# Patient Record
Sex: FEMALE | URBAN - METROPOLITAN AREA
[De-identification: names, ages, dates, MRNs, and addresses within clinical notes are randomized per-mention and may not be internally consistent; named-entity substitution may affect disease eponyms.]

---

## 2022-01-01 ENCOUNTER — APPOINTMENT (RX ONLY)
Dept: URBAN - METROPOLITAN AREA CLINIC 26 | Facility: CLINIC | Age: 67
Setting detail: DERMATOLOGY
End: 2022-01-01

## 2022-01-01 PROCEDURE — ? POST-OP WOUND CHECK

## 2022-01-01 PROCEDURE — 99024 POSTOP FOLLOW-UP VISIT: CPT

## 2022-11-29 ENCOUNTER — APPOINTMENT (RX ONLY)
Dept: URBAN - METROPOLITAN AREA CLINIC 26 | Facility: CLINIC | Age: 67
Setting detail: DERMATOLOGY
End: 2022-11-29

## 2022-11-29 PROBLEM — D03.39 MELANOMA IN SITU OF OTHER PARTS OF FACE: Status: ACTIVE | Noted: 2022-11-29

## 2022-11-29 PROCEDURE — ? ADDITIONAL NOTES

## 2022-11-29 PROCEDURE — 88342 IMHCHEM/IMCYTCHM 1ST ANTB: CPT | Mod: 59

## 2022-11-29 PROCEDURE — 14301 TIS TRNFR ANY 30.1-60 SQ CM: CPT

## 2022-11-29 PROCEDURE — 17311 MOHS 1 STAGE H/N/HF/G: CPT

## 2022-11-29 PROCEDURE — ? MOHS SURGERY WITH DEBULK SPECIMEN

## 2022-11-29 NOTE — PROCEDURE: MIPS QUALITY
Detail Level: Detailed
Quality 130: Documentation Of Current Medications In The Medical Record: Current Medications Documented
Quality 111:Pneumonia Vaccination Status For Older Adults: Pneumococcal vaccine (PPSV23) administered on or after patient’s 60th birthday and before the end of the measurement period
Quality 137: Melanoma: Continuity Of Care - Recall System: Documentation of system reason(s) for not entering patient's information into a recall system (eg, melanoma being monitored by another physician provider)
Quality 431: Preventive Care And Screening: Unhealthy Alcohol Use - Screening: Patient not identified as an unhealthy alcohol user when screened for unhealthy alcohol use using a systematic screening method
Quality 138: Melanoma: Coordination Of Care: A treatment plan was communicated to the physicians providing continuing care within one month of diagnosis outlining: diagnosis, tumor thickness and a plan for surgery or alternate care.
Quality 110: Preventive Care And Screening: Influenza Immunization: Influenza Immunization Administered during Influenza season
Quality 47: Advance Care Plan: Advance Care Planning discussed and documented; advance care plan or surrogate decision maker documented in the medical record.
Quality 226: Preventive Care And Screening: Tobacco Use: Screening And Cessation Intervention: Patient screened for tobacco use and is an ex/non-smoker

## 2022-11-29 NOTE — PROCEDURE: MOHS SURGERY WITH DEBULK SPECIMEN
Mart-1 - Positive Histology Text: MART-1 staining demonstrates areas of higher density and clustering of melanocytes with Pagetoid spread upwards within the epidermis.

## 2022-11-29 NOTE — PROCEDURE: MOHS SURGERY WITH DEBULK SPECIMEN
independent Transposition Flap Text: The defect edges were debeveled with a #15 scalpel blade.  Given the location of the defect and the proximity to free margins a transposition flap was deemed most appropriate.  Using a sterile surgical marker, an appropriate transposition flap was drawn incorporating the defect.    The area thus outlined was incised deep to adipose tissue with a #15 scalpel blade.  The skin margins were undermined to an appropriate distance in all directions utilizing iris scissors.

## 2022-11-29 NOTE — PROCEDURE: MOHS SURGERY WITH DEBULK SPECIMEN
Showering allowed/Stairs allowed/Do not make important decisions/No heavy lifting/straining/Walking - Indoors allowed Advancement Flap (Single) Text: The defect edges were debeveled with a #15 scalpel blade.  Given the location of the defect and the proximity to free margins a single advancement flap was deemed most appropriate.  Using a sterile surgical marker, an appropriate advancement flap was drawn incorporating the defect and placing the expected incisions within the relaxed skin tension lines where possible.    The area thus outlined was incised deep to adipose tissue with a #15 scalpel blade.  The skin margins were undermined to an appropriate distance in all directions utilizing iris scissors.

## 2022-11-29 NOTE — PROCEDURE: MOHS SURGERY WITH DEBULK SPECIMEN
Stage 1 Add-On Histology Text: Mart 1 controls appropriate.  On debulking specimen, there are several nests of melanocytes staining positive for MART-1 in the superficial dermis.  These may represent foci of superficial invasive, but their proximity to a hair follicle suggests they may represent tangential sectioning of melanocytes extending down a follicular unit.

## 2022-12-05 ENCOUNTER — APPOINTMENT (RX ONLY)
Dept: URBAN - METROPOLITAN AREA CLINIC 26 | Facility: CLINIC | Age: 67
Setting detail: DERMATOLOGY
End: 2022-12-05

## 2022-12-05 ENCOUNTER — OFFICE VISIT (OUTPATIENT)
Dept: WOUND CARE | Facility: HOSPITAL | Age: 67
End: 2022-12-05

## 2022-12-05 VITALS
TEMPERATURE: 97.1 F | HEART RATE: 68 BPM | WEIGHT: 248 LBS | HEIGHT: 67 IN | RESPIRATION RATE: 18 BRPM | DIASTOLIC BLOOD PRESSURE: 80 MMHG | SYSTOLIC BLOOD PRESSURE: 120 MMHG | BODY MASS INDEX: 38.92 KG/M2

## 2022-12-05 DIAGNOSIS — L89.213 PRESSURE INJURY OF RIGHT HIP, STAGE 3 (HCC): Primary | ICD-10-CM

## 2022-12-05 PROBLEM — D03.39 MELANOMA IN SITU OF OTHER PARTS OF FACE: Status: ACTIVE | Noted: 2022-12-05

## 2022-12-05 PROCEDURE — 99024 POSTOP FOLLOW-UP VISIT: CPT

## 2022-12-05 PROCEDURE — ? SUTURE REMOVAL (GLOBAL PERIOD)

## 2022-12-05 PROCEDURE — ? ADDITIONAL NOTES

## 2022-12-05 RX ORDER — LIDOCAINE 40 MG/G
CREAM TOPICAL ONCE
Status: COMPLETED | OUTPATIENT
Start: 2022-12-05 | End: 2022-12-05

## 2022-12-05 RX ADMIN — LIDOCAINE: 40 CREAM TOPICAL at 11:16

## 2022-12-05 NOTE — PATIENT INSTRUCTIONS
Orders Placed This Encounter   Procedures    Wound cleansing and dressings     RIGHT HIP  Wash your hands with soap and water  Remove old dressing, discard into plastic bag and place in trash  Cleanse the wound with MILD SOAP AND WATER OR NORMAL SALINE prior to applying a clean dressing  Do not use tissue or cotton balls  Do not scrub the wound  Pat dry using gauze  Shower YES  Apply PURACOL (COLLAGEN) to the RIGHT HIP wound  Secure with ALLEVYN BORDER (SILICON DRESSING)  Change dressing MWF  Dressing above applied today at [facility]  Next appt  in 1 week  Offload pressure as much as possible  Continue with protein smoothies, and protein into diet       Standing Status:   Future     Standing Expiration Date:   12/5/2023

## 2022-12-05 NOTE — PROCEDURE: SUTURE REMOVAL (GLOBAL PERIOD)
Detail Level: Detailed
Add 15755 Cpt? (Important Note: In 2017 The Use Of 62381 Is Being Tracked By Cms To Determine Future Global Period Reimbursement For Global Periods): yes

## 2022-12-08 NOTE — PROGRESS NOTES
Patient ID: Haleigh Cartwright is a 79 y o  female Date of Birth 1955     Chief Complaint  Chief Complaint   Patient presents with   • New Patient Visit       Allergies  Patient has no allergy information on record  Assessment:    No problem-specific Assessment & Plan notes found for this encounter  Diagnoses and all orders for this visit:    Pressure injury of right hip, stage 3 (HCC)  -     lidocaine (LMX) 4 % cream  -     Wound cleansing and dressings; Future  -     Debridement              Debridement   Wound 12/05/22 Pressure Injury Hip Right    Universal Protocol:  Consent: Verbal consent obtained  Consent given by: patient  Time out: Immediately prior to procedure a "time out" was called to verify the correct patient, procedure, equipment, support staff and site/side marked as required    Timeout called at: 12/5/2022 10:45 AM   Patient understanding: patient states understanding of the procedure being performed  Patient identity confirmed: verbally with patient      Performed by: physician  Debridement type: surgical  Level of debridement: subcutaneous tissue  Pain control: lidocaine 4%  Post-debridement measurements  Length (cm): 1  Width (cm): 1 1  Depth (cm): 0 3  Percent debrided: 100%  Surface Area (cm^2): 1 1  Area debrided (cm^2): 1 1  Volume (cm^3): 0 33  Tissue and other material debrided: subcutaneous tissue  Devitalized tissue debrided: exudate and slough  Instrument(s) utilized: curette  Bleeding: small  Hemostasis obtained with: pressure  Response to treatment: procedure was tolerated well          Plan:  Initial evaluation  Debrided as above  Changed wound management to Puracol and foam, see wound orders below  Pressure-relief  Adequate protein intake  Follow-up in 1 week or call sooner with questions or concerns    Wound 12/05/22 Pressure Injury Hip Right (Active)   Wound Image Images linked 12/05/22 1120   Wound Description Pink;Yellow;Slough 12/05/22 1104   Genna-wound Assessment Intact 12/05/22 1104   Wound Length (cm) 1 cm 12/05/22 1104   Wound Width (cm) 1 1 cm 12/05/22 1104   Wound Depth (cm) 0 2 cm 12/05/22 1104   Wound Surface Area (cm^2) 1 1 cm^2 12/05/22 1104   Wound Volume (cm^3) 0 22 cm^3 12/05/22 1104   Calculated Wound Volume (cm^3) 0 22 cm^3 12/05/22 1104   Drainage Amount Moderate 12/05/22 1104   Drainage Description Serosanguineous 12/05/22 1104   Non-staged Wound Description Full thickness 12/05/22 1104   Dressing Status Intact 12/05/22 1104       Wound 12/05/22 Pressure Injury Hip Right (Active)   Date First Assessed/Time First Assessed: 12/05/22 1104   Primary Wound Type: Pressure Injury  Location: Hip  Wound Location Orientation: Right       Subjective:        Patient presents for initial evaluation of a stage III pressure ulcer of the right hip unclear exactly how it started but it may have been initially a burn has been present for the past few months  Patient has been following at another wound center in [place]  They have been using Hydrofera Blue on the wound for the past few months and noted some slow improvement  Patient is currently undergoing treatment for glioblastoma  The following portions of the patient's history were reviewed and updated as appropriate:   She  has no past medical history on file  She There are no problems to display for this patient  She  has no past surgical history on file  She  reports that she has never smoked  She has never used smokeless tobacco  No history on file for alcohol use and drug use  No current outpatient medications on file  No current facility-administered medications for this visit  She has no allergies on file       Review of Systems   Constitutional: Negative for chills and fever  HENT: Negative for congestion and sneezing  Respiratory: Negative for cough  Skin: Positive for wound  Psychiatric/Behavioral: Negative for agitation           Objective:       Wound 12/05/22 Pressure Injury Hip Right (Active)   Wound Image Images linked 12/05/22 1120   Wound Description Pink;Yellow;Slough 12/05/22 1104   Genna-wound Assessment Intact 12/05/22 1104   Wound Length (cm) 1 cm 12/05/22 1104   Wound Width (cm) 1 1 cm 12/05/22 1104   Wound Depth (cm) 0 2 cm 12/05/22 1104   Wound Surface Area (cm^2) 1 1 cm^2 12/05/22 1104   Wound Volume (cm^3) 0 22 cm^3 12/05/22 1104   Calculated Wound Volume (cm^3) 0 22 cm^3 12/05/22 1104   Drainage Amount Moderate 12/05/22 1104   Drainage Description Serosanguineous 12/05/22 1104   Non-staged Wound Description Full thickness 12/05/22 1104   Dressing Status Intact 12/05/22 1104       /80   Pulse 68   Temp (!) 97 1 °F (36 2 °C)   Resp 18   Ht 5' 6 5" (1 689 m)   Wt 112 kg (248 lb)   BMI 39 43 kg/m²     Physical Exam  Vitals reviewed  Constitutional:       General: She is not in acute distress  Appearance: Normal appearance  She is obese  She is not ill-appearing, toxic-appearing or diaphoretic  HENT:      Head: Normocephalic and atraumatic  Right Ear: External ear normal       Left Ear: External ear normal    Eyes:      Conjunctiva/sclera: Conjunctivae normal    Pulmonary:      Effort: Pulmonary effort is normal  No respiratory distress  Musculoskeletal:      Cervical back: Neck supple  Skin:     Comments: See wound assessment   Neurological:      Mental Status: She is alert     Psychiatric:         Mood and Affect: Mood normal          Behavior: Behavior normal            Wound 12/05/22 Pressure Injury Hip Right (Active)   Wound Image   12/05/22 1120   Wound Description Pink;Yellow;Slough 12/05/22 1104   Genna-wound Assessment Intact 12/05/22 1104   Wound Length (cm) 1 cm 12/05/22 1104   Wound Width (cm) 1 1 cm 12/05/22 1104   Wound Depth (cm) 0 2 cm 12/05/22 1104   Wound Surface Area (cm^2) 1 1 cm^2 12/05/22 1104   Wound Volume (cm^3) 0 22 cm^3 12/05/22 1104   Calculated Wound Volume (cm^3) 0 22 cm^3 12/05/22 1104   Drainage Amount Moderate 12/05/22 1104   Drainage Description Serosanguineous 12/05/22 1104   Non-staged Wound Description Full thickness 12/05/22 1104   Dressing Status Intact 12/05/22 1104                         Wound Instructions:  Orders Placed This Encounter   Procedures   • Wound cleansing and dressings     RIGHT HIP  Wash your hands with soap and water  Remove old dressing, discard into plastic bag and place in trash  Cleanse the wound with MILD SOAP AND WATER OR NORMAL SALINE prior to applying a clean dressing  Do not use tissue or cotton balls  Do not scrub the wound  Pat dry using gauze  Shower YES  Apply PURACOL (COLLAGEN) to the RIGHT HIP wound  Secure with ALLEVYN BORDER (SILICON DRESSING)  Change dressing MWF  Dressing above applied today at [facility]  Next appt  in 1 week  Offload pressure as much as possible  Continue with protein smoothies, and protein into diet  Standing Status:   Future     Standing Expiration Date:   12/5/2023   • Debridement     This order was created via procedure documentation        Diagnosis ICD-10-CM Associated Orders   1   Pressure injury of right hip, stage 3 (HCC)  L89 213 lidocaine (LMX) 4 % cream     Wound cleansing and dressings     Debridement

## 2022-12-12 ENCOUNTER — OFFICE VISIT (OUTPATIENT)
Dept: WOUND CARE | Facility: HOSPITAL | Age: 67
End: 2022-12-12

## 2022-12-12 VITALS — SYSTOLIC BLOOD PRESSURE: 114 MMHG | DIASTOLIC BLOOD PRESSURE: 60 MMHG | RESPIRATION RATE: 18 BRPM | HEART RATE: 72 BPM

## 2022-12-12 DIAGNOSIS — L89.213 PRESSURE INJURY OF RIGHT HIP, STAGE 3 (HCC): Primary | ICD-10-CM

## 2022-12-12 RX ORDER — SIMVASTATIN 40 MG
40 TABLET ORAL EVERY EVENING
COMMUNITY
Start: 2022-11-16

## 2022-12-12 RX ORDER — METHYLPHENIDATE HYDROCHLORIDE 5 MG/1
5 TABLET ORAL 2 TIMES DAILY PRN
COMMUNITY
Start: 2022-10-24 | End: 2023-01-22

## 2022-12-12 RX ORDER — SENNA PLUS 8.6 MG/1
2 TABLET ORAL DAILY PRN
COMMUNITY
Start: 2022-06-28

## 2022-12-12 RX ORDER — LOSARTAN POTASSIUM 50 MG/1
50 TABLET ORAL DAILY
COMMUNITY
Start: 2022-06-30

## 2022-12-12 RX ORDER — HYDROCHLOROTHIAZIDE 25 MG/1
25 TABLET ORAL EVERY MORNING
COMMUNITY
Start: 2022-12-04

## 2022-12-12 RX ORDER — DEXAMETHASONE 2 MG/1
1 TABLET ORAL DAILY
COMMUNITY
Start: 2022-11-10

## 2022-12-12 RX ORDER — LIDOCAINE 40 MG/G
CREAM TOPICAL ONCE
Status: COMPLETED | OUTPATIENT
Start: 2022-12-12 | End: 2022-12-12

## 2022-12-12 RX ORDER — PSEUDOEPHEDRINE HCL 30 MG
100 TABLET ORAL 2 TIMES DAILY
COMMUNITY
Start: 2022-06-27

## 2022-12-12 RX ORDER — FAMOTIDINE 10 MG
10 TABLET ORAL DAILY
COMMUNITY
Start: 2022-06-27

## 2022-12-12 RX ORDER — ACETAMINOPHEN 325 MG/1
650 TABLET ORAL
COMMUNITY

## 2022-12-12 RX ORDER — PROPRANOLOL HYDROCHLORIDE 20 MG/1
20 TABLET ORAL
COMMUNITY
Start: 2022-09-06

## 2022-12-12 RX ORDER — ONDANSETRON HYDROCHLORIDE 8 MG/1
TABLET, FILM COATED ORAL
COMMUNITY
Start: 2022-11-26

## 2022-12-12 RX ORDER — POLYETHYLENE GLYCOL 3350 17 G/17G
17 POWDER, FOR SOLUTION ORAL DAILY PRN
COMMUNITY
Start: 2022-06-27

## 2022-12-12 RX ORDER — TEMOZOLOMIDE 100 MG/1
CAPSULE ORAL
COMMUNITY
Start: 2022-11-10

## 2022-12-12 RX ADMIN — LIDOCAINE 1 APPLICATION: 40 CREAM TOPICAL at 11:06

## 2022-12-12 NOTE — PROGRESS NOTES
Patient ID: Ravin Winston is a 79 y o  female Date of Birth 1955     Chief Complaint  Chief Complaint   Patient presents with   • Follow Up Wound Care Visit     Right hip wound       Allergies  Codeine, Oxycodone, Oxycodone-acetaminophen, and Tramadol    Assessment:    No problem-specific Assessment & Plan notes found for this encounter  Diagnoses and all orders for this visit:    Pressure injury of right hip, stage 3 (HCC)  -     Wound cleansing and dressings; Future  -     lidocaine (LMX) 4 % cream  -     Debridement              Debridement   Wound 12/05/22 Pressure Injury Hip Right    Universal Protocol:  Consent: Verbal consent obtained  Consent given by: patient  Time out: Immediately prior to procedure a "time out" was called to verify the correct patient, procedure, equipment, support staff and site/side marked as required    Timeout called at: 12/12/2022 10:45 AM   Patient understanding: patient states understanding of the procedure being performed  Patient identity confirmed: verbally with patient      Performed by: physician  Debridement type: selective  Pain control: lidocaine 4%  Post-debridement measurements  Length (cm): 0 9  Width (cm): 0 8  Depth (cm): 0 1  Percent debrided: 100%  Surface Area (cm^2): 0 72  Area debrided (cm^2): 0 72  Volume (cm^3): 0 07  Devitalized tissue debrided: biofilm  Instrument(s) utilized: curette  Bleeding: small  Hemostasis obtained with: pressure  Procedural pain (0-10): 0  Post-procedural pain: 0   Response to treatment: procedure was tolerated well          Plan:  Wound is improved  Debrided as above  Continue wound management Puracol Ag and foam, see wound orders below  Pressure-relief  Follow-up in 3 weeks or call sooner with questions or concerns    Wound 12/05/22 Pressure Injury Hip Right (Active)   Wound Image Images linked 12/12/22 1034   Wound Description Pink;Yellow;Slough 12/12/22 1034   Pressure Injury Stage 2 12/12/22 1034   Genna-wound Assessment Intact 12/12/22 1034   Wound Length (cm) 0 9 cm 12/12/22 1034   Wound Width (cm) 0 8 cm 12/12/22 1034   Wound Depth (cm) 0 1 cm 12/12/22 1034   Wound Surface Area (cm^2) 0 72 cm^2 12/12/22 1034   Wound Volume (cm^3) 0 072 cm^3 12/12/22 1034   Calculated Wound Volume (cm^3) 0 07 cm^3 12/12/22 1034   Change in Wound Size % 68 18 12/12/22 1034   Drainage Amount Moderate 12/12/22 1034   Drainage Description Serosanguineous 12/12/22 1034   Non-staged Wound Description Full thickness 12/12/22 1034       Wound 12/05/22 Pressure Injury Hip Right (Active)   Date First Assessed/Time First Assessed: 12/05/22 1104   Primary Wound Type: Pressure Injury  Location: Hip  Wound Location Orientation: Right       Subjective:        Patient presents for follow-up of a stage III pressure ulcer of the right hip  No increased pain or drainage  Has been using Puracol and foam on the wound  The following portions of the patient's history were reviewed and updated as appropriate: She  has no past medical history on file  She There are no problems to display for this patient  She  reports that she has never smoked  She has never used smokeless tobacco  No history on file for alcohol use and drug use  She is allergic to codeine, oxycodone, oxycodone-acetaminophen, and tramadol       Review of Systems   Constitutional: Negative for chills and fever  HENT: Negative for congestion and sneezing  Respiratory: Negative for cough  Skin: Positive for wound  Psychiatric/Behavioral: Negative for agitation           Objective:       Wound 12/05/22 Pressure Injury Hip Right (Active)   Wound Image Images linked 12/12/22 1034   Wound Description Pink;Yellow;Slough 12/12/22 1034   Pressure Injury Stage 2 12/12/22 1034   Genna-wound Assessment Intact 12/12/22 1034   Wound Length (cm) 0 9 cm 12/12/22 1034   Wound Width (cm) 0 8 cm 12/12/22 1034   Wound Depth (cm) 0 1 cm 12/12/22 1034   Wound Surface Area (cm^2) 0 72 cm^2 12/12/22 1034   Wound Volume (cm^3) 0 072 cm^3 12/12/22 1034   Calculated Wound Volume (cm^3) 0 07 cm^3 12/12/22 1034   Change in Wound Size % 68 18 12/12/22 1034   Drainage Amount Moderate 12/12/22 1034   Drainage Description Serosanguineous 12/12/22 1034   Non-staged Wound Description Full thickness 12/12/22 1034       /60   Pulse 72   Resp 18     Physical Exam        Wound 12/05/22 Pressure Injury Hip Right (Active)   Wound Image   12/12/22 1034   Wound Description Pink;Yellow;Slough 12/12/22 1034   Pressure Injury Stage 2 12/12/22 1034   Genna-wound Assessment Intact 12/12/22 1034   Wound Length (cm) 0 9 cm 12/12/22 1034   Wound Width (cm) 0 8 cm 12/12/22 1034   Wound Depth (cm) 0 1 cm 12/12/22 1034   Wound Surface Area (cm^2) 0 72 cm^2 12/12/22 1034   Wound Volume (cm^3) 0 072 cm^3 12/12/22 1034   Calculated Wound Volume (cm^3) 0 07 cm^3 12/12/22 1034   Change in Wound Size % 68 18 12/12/22 1034   Drainage Amount Moderate 12/12/22 1034   Drainage Description Serosanguineous 12/12/22 1034   Non-staged Wound Description Full thickness 12/12/22 1034   Dressing Status Intact 12/05/22 1104                         Wound Instructions:  Orders Placed This Encounter   Procedures   • Wound cleansing and dressings     Wound cleansing and dressings      RIGHT HIP  Wash your hands with soap and water  Remove old dressing, discard into plastic bag and place in trash  Cleanse the wound with MILD SOAP AND WATER OR NORMAL SALINE prior to applying a clean dressing  Do not use tissue or cotton balls  Do not scrub the wound  Pat dry using gauze      Shower YES  Apply PURACOL (COLLAGEN) to the RIGHT HIP wound  Secure with ALLEVYN BORDER (SILICON DRESSING)  Change dressing MWF  Dressing above applied today at [facility]  Next appt in 3 weeks on Thursday 01/05/2022     Offload pressure as much as possible  Continue with protein smoothies, and protein into diet       Standing Status:   Future     Standing Expiration Date: 12/12/2023   • Debridement     This order was created via procedure documentation        Diagnosis ICD-10-CM Associated Orders   1   Pressure injury of right hip, stage 3 (HCC)  L89 213 Wound cleansing and dressings     lidocaine (LMX) 4 % cream     Debridement

## 2022-12-19 ENCOUNTER — APPOINTMENT (RX ONLY)
Dept: URBAN - METROPOLITAN AREA CLINIC 26 | Facility: CLINIC | Age: 67
Setting detail: DERMATOLOGY
End: 2022-12-19

## 2022-12-19 PROBLEM — D03.39 MELANOMA IN SITU OF OTHER PARTS OF FACE: Status: ACTIVE | Noted: 2022-12-19

## 2022-12-19 PROCEDURE — ? ADDITIONAL NOTES

## 2022-12-19 PROCEDURE — ? ORDER TESTS

## 2022-12-19 PROCEDURE — ? PRESCRIPTION MEDICATION MANAGEMENT

## 2022-12-19 PROCEDURE — ? POST-OP WOUND CHECK

## 2022-12-19 PROCEDURE — 99024 POSTOP FOLLOW-UP VISIT: CPT

## 2022-12-19 NOTE — PROCEDURE: POST-OP WOUND CHECK
Detail Level: Detailed
Add 83090 Cpt? (Important Note: In 2017 The Use Of 87260 Is Being Tracked By Cms To Determine Future Global Period Reimbursement For Global Periods): yes
Wound Assessment Override (Optional): The wound is infected and draining purulent fluid. It was dehisced minimally.
Wound Evaluated By: Sylvester Richardson M.D.
Additional Comments: Dr. Richardson drained the purulent matter from the surgical site and sent a sample for culture.  A solitary prolene fragment was removed.  Patient is on Bactrim M/W/F.  Advised patient to increase this to QD for 7 days then may resume M/W/F regimen.  Patient has follow up scheduled on January 9, but was advised to contact our office for an earlier appointment if there is any worsening in her condition or if she has any other conerns.

## 2022-12-19 NOTE — PROCEDURE: PRESCRIPTION MEDICATION MANAGEMENT
Detail Level: Zone
Initiate Treatment: Trimethoprim Sulfamethoxazole
Plan: Take 1 tablet once daily for 7 days
Render In Strict Bullet Format?: No

## 2022-12-19 NOTE — PROCEDURE: ORDER TESTS
Bill For Surgical Tray: no
Expected Date Of Service: 12/19/2022
Billing Type: Third-Party Bill
Lab Facility: 0
Performing Laboratory: -[id number]

## 2022-12-19 NOTE — PROCEDURE: MIPS QUALITY
Quality 110: Preventive Care And Screening: Influenza Immunization: Influenza Immunization Administered during Influenza season
Quality 47: Advance Care Plan: Advance Care Planning discussed and documented; advance care plan or surrogate decision maker documented in the medical record.
Quality 431: Preventive Care And Screening: Unhealthy Alcohol Use - Screening: Patient not identified as an unhealthy alcohol user when screened for unhealthy alcohol use using a systematic screening method
Quality 138: Melanoma: Coordination Of Care: A treatment plan was communicated to the physicians providing continuing care within one month of diagnosis outlining: diagnosis, tumor thickness and a plan for surgery or alternate care.
Quality 226: Preventive Care And Screening: Tobacco Use: Screening And Cessation Intervention: Patient screened for tobacco use and is an ex/non-smoker
Quality 111:Pneumonia Vaccination Status For Older Adults: Pneumococcal vaccine (PPSV23) administered on or after patient’s 60th birthday and before the end of the measurement period
Quality 137: Melanoma: Continuity Of Care - Recall System: Documentation of system reason(s) for not entering patient's information into a recall system (eg, melanoma being monitored by another physician provider)
Detail Level: Detailed
Quality 130: Documentation Of Current Medications In The Medical Record: Current Medications Documented

## 2022-12-27 PROBLEM — D03.39 MELANOMA IN SITU OF OTHER PARTS OF FACE: Status: ACTIVE | Noted: 2022-01-01

## 2022-12-27 NOTE — PROCEDURE: POST-OP WOUND CHECK
Detail Level: Detailed
Add 32683 Cpt? (Important Note: In 2017 The Use Of 83702 Is Being Tracked By Cms To Determine Future Global Period Reimbursement For Global Periods): yes
Wound Assessment Override (Optional): fluctuant nodule lateral to midportion of flap
Wound Evaluated By: Sylvester Richardson M.D.
Additional Comments: Fluctuant nodule was incised and drained of all purulence.  Previously drained nodules have not recurred and the remainder of the flap is healing accordingly.  The patient has completed a course of TMP/SMX.  She was asked to contact us if any new nodules develop that might require drainage.  Otherwise she may follow up as scheduled.

## 2023-01-01 ENCOUNTER — RX ONLY (OUTPATIENT)
Age: 68
Setting detail: RX ONLY
End: 2023-01-01

## 2023-01-01 ENCOUNTER — APPOINTMENT (RX ONLY)
Dept: URBAN - METROPOLITAN AREA CLINIC 26 | Facility: CLINIC | Age: 68
Setting detail: DERMATOLOGY
End: 2023-01-01

## 2023-01-01 PROCEDURE — ? ORDER TESTS

## 2023-01-01 PROCEDURE — ? POST-OP WOUND CHECK

## 2023-01-01 PROCEDURE — 99024 POSTOP FOLLOW-UP VISIT: CPT

## 2023-01-01 PROCEDURE — ? ADDITIONAL NOTES

## 2023-01-01 RX ORDER — SULFAMETHOXAZOLE AND TRIMETHOPRIM 800; 160 MG/1; MG/1
800-160 TABLET ORAL BID
Qty: 14 | Refills: 0 | Status: ERX | COMMUNITY
Start: 2023-01-01

## 2023-01-04 PROBLEM — D03.39 MELANOMA IN SITU OF OTHER PARTS OF FACE: Status: ACTIVE | Noted: 2023-01-01

## 2023-01-04 NOTE — PROCEDURE: ORDER TESTS
Billing Type: Third-Party Bill
Bill For Surgical Tray: no
Expected Date Of Service: 01/04/2023
Lab Facility: 0
Performing Laboratory: -[id number]

## 2023-01-04 NOTE — PROCEDURE: POST-OP WOUND CHECK
Additional Comments: Wound was drained by Dr. Richardson, sample obtained for culture. She is scheduled to see her oncologist and may resume antiobiotic regimen if he prefers to continue. Additional Comments: Wound was drained by Dr. Richardosn, sample obtained for culture. She is scheduled to see her oncologist and may resume antiobiotic regimen if he prefers to continue.

## 2023-01-04 NOTE — PROCEDURE: POST-OP WOUND CHECK
Add 93104 Cpt? (Important Note: In 2017 The Use Of 09816 Is Being Tracked By Cms To Determine Future Global Period Reimbursement For Global Periods): yes

## 2023-01-09 PROBLEM — D03.39 MELANOMA IN SITU OF OTHER PARTS OF FACE: Status: ACTIVE | Noted: 2023-01-01

## 2023-01-09 NOTE — PROCEDURE: POST-OP WOUND CHECK
Detail Level: Detailed
Add 94558 Cpt? (Important Note: In 2017 The Use Of 43627 Is Being Tracked By Cms To Determine Future Global Period Reimbursement For Global Periods): yes
Wound Assessment Override (Optional): clean, no purulence, infection resolving
Wound Dressing Override (Optional): band aid
Wound Evaluated By: Sylvester Richardson M.D.
Additional Comments: Blood was noted from the lateral edge of the wound when Dr. Richardson drained the wound. He did not recommend starting patient on antibiotic since patient has improved clinically.  Culture results are pending.

## 2023-01-12 ENCOUNTER — OFFICE VISIT (OUTPATIENT)
Dept: WOUND CARE | Facility: HOSPITAL | Age: 68
End: 2023-01-12

## 2023-01-12 VITALS
HEART RATE: 84 BPM | RESPIRATION RATE: 18 BRPM | SYSTOLIC BLOOD PRESSURE: 122 MMHG | TEMPERATURE: 98.8 F | DIASTOLIC BLOOD PRESSURE: 80 MMHG

## 2023-01-12 DIAGNOSIS — L89.213 PRESSURE INJURY OF RIGHT HIP, STAGE 3 (HCC): Primary | ICD-10-CM

## 2023-01-12 RX ORDER — TRAMADOL HYDROCHLORIDE 50 MG/1
50 TABLET ORAL EVERY 6 HOURS PRN
COMMUNITY

## 2023-01-12 RX ORDER — GABAPENTIN 100 MG/1
100 CAPSULE ORAL 3 TIMES DAILY
COMMUNITY

## 2023-01-12 NOTE — PATIENT INSTRUCTIONS
Orders Placed This Encounter   Procedures    Wound cleansing and dressings     Your wound is healed  You are discharged from wound care  Cover the hip area with a foam silicone bordered gauze for about a week to protect the new fragile skin  Please call wound care if you should develop any new wounds       Standing Status:   Future     Standing Expiration Date:   1/12/2024

## 2023-01-17 NOTE — PROGRESS NOTES
Patient ID: Sylvie Uribe is a 79 y o  female Date of Birth 1955     Chief Complaint  Chief Complaint   Patient presents with   • Follow Up Wound Care Visit     Hip wound       Allergies  Codeine, Oxycodone, Oxycodone-acetaminophen, and Tramadol    Assessment:    No problem-specific Assessment & Plan notes found for this encounter  Diagnoses and all orders for this visit:    Pressure injury of right hip, stage 3 (HCC)  -     Wound cleansing and dressings; Future    Other orders  -     gabapentin (NEURONTIN) 100 mg capsule; Take 100 mg by mouth 3 (three) times a day  -     traMADol (ULTRAM) 50 mg tablet; Take 50 mg by mouth every 6 (six) hours as needed for moderate pain              Procedures    Plan:  Wound is closed  Keep the area covered and protected for about another week  Follow-up here in wound management center as needed or call with questions or concerns    Wound 12/05/22 Pressure Injury Hip Right (Active)   Wound Image Images linked 01/12/23 1024   Wound Description Epithelialization;Pink 01/12/23 1024   Genna-wound Assessment Intact 01/12/23 1024   Wound Length (cm) 0 cm 01/12/23 1024   Wound Width (cm) 0 cm 01/12/23 1024   Wound Depth (cm) 0 cm 01/12/23 1024   Wound Surface Area (cm^2) 0 cm^2 01/12/23 1024   Wound Volume (cm^3) 0 cm^3 01/12/23 1024   Calculated Wound Volume (cm^3) 0 cm^3 01/12/23 1024   Change in Wound Size % 100 01/12/23 1024   Drainage Amount None 01/12/23 1024   Non-staged Wound Description Not applicable 82/40/97 6332   Dressing Status Intact 01/12/23 1024       Wound 12/05/22 Pressure Injury Hip Right (Active)   Date First Assessed/Time First Assessed: 12/05/22 1104   Primary Wound Type: Pressure Injury  Location: Hip  Wound Location Orientation: Right       Subjective:        Follow-up of a stage III pressure ulcer of the right hip  No significant pain or drainage    Has been using Puracol Ag and a foam on the wound      The following portions of the patient's history were reviewed and updated as appropriate:   She  has no past medical history on file  She There are no problems to display for this patient  She  reports that she has never smoked  She has never used smokeless tobacco  No history on file for alcohol use and drug use  Current Outpatient Medications   Medication Sig Dispense Refill   • gabapentin (NEURONTIN) 100 mg capsule Take 100 mg by mouth 3 (three) times a day     • traMADol (ULTRAM) 50 mg tablet Take 50 mg by mouth every 6 (six) hours as needed for moderate pain     • acetaminophen (TYLENOL) 325 mg tablet Take 650 mg by mouth     • dexamethasone (DECADRON) 2 mg tablet Take 1 mg by mouth in the morning     • Docusate Sodium (DSS) 100 MG CAPS Take 100 mg by mouth 2 (two) times a day     • famotidine (PEPCID) 10 mg tablet Take 10 mg by mouth in the morning     • hydrochlorothiazide (HYDRODIURIL) 25 mg tablet Take 25 mg by mouth every morning     • losartan (COZAAR) 50 mg tablet Take 50 mg by mouth daily     • methylphenidate (RITALIN) 5 mg tablet Take 5 mg by mouth 2 (two) times a day as needed     • Multiple Vitamin (MULTIVITAMIN ADULT PO) Take 1 capsule by mouth daily     • ondansetron (ZOFRAN) 8 mg tablet TAKE 1 TABLET BY MOUTH 30 MIN BEFORE CHEMOTHERAPY IN THE EVENING     • polyethylene glycol (GLYCOLAX) 17 GM/SCOOP powder Take 17 g by mouth daily as needed     • propranolol (INDERAL) 20 mg tablet Take 20 mg by mouth daily at bedtime     • senna (SENOKOT) 8 6 MG tablet Take 2 tablets by mouth daily as needed     • simvastatin (ZOCOR) 40 mg tablet Take 40 mg by mouth every evening     • temozolomide (TEMODAR) 100 mg capsule Take two 180 mg capsules and one 100 mg capsule by mouth for a total dose of 460 mg nightly       No current facility-administered medications for this visit  She is allergic to codeine, oxycodone, oxycodone-acetaminophen, and tramadol       Review of Systems   Constitutional: Negative for chills and fever     HENT: Negative for congestion and sneezing  Respiratory: Negative for cough  Musculoskeletal: Positive for gait problem  Skin: Positive for wound  Psychiatric/Behavioral: Negative for agitation  Objective:       Wound 12/05/22 Pressure Injury Hip Right (Active)   Wound Image Images linked 01/12/23 1024   Wound Description Epithelialization;Pink 01/12/23 1024   Genna-wound Assessment Intact 01/12/23 1024   Wound Length (cm) 0 cm 01/12/23 1024   Wound Width (cm) 0 cm 01/12/23 1024   Wound Depth (cm) 0 cm 01/12/23 1024   Wound Surface Area (cm^2) 0 cm^2 01/12/23 1024   Wound Volume (cm^3) 0 cm^3 01/12/23 1024   Calculated Wound Volume (cm^3) 0 cm^3 01/12/23 1024   Change in Wound Size % 100 01/12/23 1024   Drainage Amount None 01/12/23 1024   Non-staged Wound Description Not applicable 40/20/21 4151   Dressing Status Intact 01/12/23 1024       /80   Pulse 84   Temp 98 8 °F (37 1 °C)   Resp 18     Physical Exam  Constitutional:       General: She is not in acute distress  Appearance: Normal appearance  She is obese  She is not ill-appearing or toxic-appearing  HENT:      Head: Normocephalic and atraumatic  Right Ear: External ear normal       Left Ear: External ear normal    Eyes:      Conjunctiva/sclera: Conjunctivae normal    Pulmonary:      Effort: Pulmonary effort is normal  No respiratory distress  Musculoskeletal:      Cervical back: Neck supple  Skin:     Comments: See wound assessment, wound appears closed   Neurological:      Mental Status: She is alert        Gait: Gait abnormal    Psychiatric:         Mood and Affect: Mood normal          Behavior: Behavior normal            Wound 12/05/22 Pressure Injury Hip Right (Active)   Wound Image   01/12/23 1024   Wound Description Epithelialization;Pink 01/12/23 1024   Pressure Injury Stage 2 12/12/22 1034   Genna-wound Assessment Intact 01/12/23 1024   Wound Length (cm) 0 cm 01/12/23 1024   Wound Width (cm) 0 cm 01/12/23 1024   Wound Depth (cm) 0 cm 01/12/23 1024   Wound Surface Area (cm^2) 0 cm^2 01/12/23 1024   Wound Volume (cm^3) 0 cm^3 01/12/23 1024   Calculated Wound Volume (cm^3) 0 cm^3 01/12/23 1024   Change in Wound Size % 100 01/12/23 1024   Drainage Amount None 01/12/23 1024   Drainage Description Serosanguineous 12/12/22 1034   Non-staged Wound Description Not applicable 57/27/47 8253   Dressing Status Intact 01/12/23 1024                       Wound Instructions:  Orders Placed This Encounter   Procedures   • Wound cleansing and dressings     Your wound is healed  You are discharged from wound care  Cover the hip area with a foam silicone bordered gauze for about a week to protect the new fragile skin  Please call wound care if you should develop any new wounds  Standing Status:   Future     Standing Expiration Date:   1/12/2024        Diagnosis ICD-10-CM Associated Orders   1   Pressure injury of right hip, stage 3 ([facility])  L89 213 Wound cleansing and dressings